# Patient Record
Sex: FEMALE | Employment: STUDENT | URBAN - METROPOLITAN AREA
[De-identification: names, ages, dates, MRNs, and addresses within clinical notes are randomized per-mention and may not be internally consistent; named-entity substitution may affect disease eponyms.]

---
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
## 2021-03-30 ENCOUNTER — OFFICE VISIT (OUTPATIENT)
Dept: FAMILY MEDICINE CLINIC | Facility: CLINIC | Age: 21
End: 2021-03-30
Payer: COMMERCIAL

## 2021-03-30 VITALS
HEIGHT: 67 IN | SYSTOLIC BLOOD PRESSURE: 100 MMHG | DIASTOLIC BLOOD PRESSURE: 68 MMHG | TEMPERATURE: 99 F | HEART RATE: 86 BPM | BODY MASS INDEX: 20.4 KG/M2 | WEIGHT: 130 LBS

## 2021-03-30 DIAGNOSIS — Z76.89 ENCOUNTER TO ESTABLISH CARE: Primary | ICD-10-CM

## 2021-03-30 DIAGNOSIS — Z11.1 SCREENING-PULMONARY TB: ICD-10-CM

## 2021-03-30 PROCEDURE — 3074F SYST BP LT 130 MM HG: CPT | Performed by: FAMILY MEDICINE

## 2021-03-30 PROCEDURE — 86580 TB INTRADERMAL TEST: CPT | Performed by: FAMILY MEDICINE

## 2021-03-30 PROCEDURE — 3078F DIAST BP <80 MM HG: CPT | Performed by: FAMILY MEDICINE

## 2021-03-30 PROCEDURE — 99203 OFFICE O/P NEW LOW 30 MIN: CPT | Performed by: FAMILY MEDICINE

## 2021-03-30 PROCEDURE — 3008F BODY MASS INDEX DOCD: CPT | Performed by: FAMILY MEDICINE

## 2021-03-30 NOTE — PROGRESS NOTES
HPI:   Mitesh Alicia is a 24year old female who presents for an establish care and medical clearance to work. Patient is working at [employer] as a . She is up-to-date with her Tdap and MMR vaccines.   Does not recall her last TB t presents for a complete physical exam.    1. Encounter to establish care  -Medical, surgical and social history, as well as medications and allergies were reviewed with patient.     2. Screening-pulmonary TB  - medical clearance form received and filled out

## 2021-04-02 ENCOUNTER — NURSE ONLY (OUTPATIENT)
Dept: FAMILY MEDICINE CLINIC | Facility: CLINIC | Age: 21
End: 2021-04-02
Payer: COMMERCIAL

## 2021-04-02 DIAGNOSIS — Z11.1 PPD SCREENING TEST: Primary | ICD-10-CM

## 2021-10-19 ENCOUNTER — OFFICE VISIT (OUTPATIENT)
Dept: FAMILY MEDICINE CLINIC | Facility: CLINIC | Age: 21
End: 2021-10-19
Payer: COMMERCIAL

## 2021-10-19 VITALS
HEART RATE: 82 BPM | WEIGHT: 136 LBS | BODY MASS INDEX: 21.35 KG/M2 | DIASTOLIC BLOOD PRESSURE: 80 MMHG | HEIGHT: 67 IN | TEMPERATURE: 99 F | SYSTOLIC BLOOD PRESSURE: 117 MMHG

## 2021-10-19 DIAGNOSIS — Z23 NEED FOR DIPHTHERIA-TETANUS-PERTUSSIS (TDAP) VACCINE: ICD-10-CM

## 2021-10-19 DIAGNOSIS — Z00.00 ANNUAL PHYSICAL EXAM: Primary | ICD-10-CM

## 2021-10-19 DIAGNOSIS — N30.00 ACUTE CYSTITIS WITHOUT HEMATURIA: ICD-10-CM

## 2021-10-19 PROCEDURE — 99395 PREV VISIT EST AGE 18-39: CPT | Performed by: FAMILY MEDICINE

## 2021-10-19 PROCEDURE — 3079F DIAST BP 80-89 MM HG: CPT | Performed by: FAMILY MEDICINE

## 2021-10-19 PROCEDURE — 90471 IMMUNIZATION ADMIN: CPT | Performed by: FAMILY MEDICINE

## 2021-10-19 PROCEDURE — 90715 TDAP VACCINE 7 YRS/> IM: CPT | Performed by: FAMILY MEDICINE

## 2021-10-19 PROCEDURE — 3008F BODY MASS INDEX DOCD: CPT | Performed by: FAMILY MEDICINE

## 2021-10-19 PROCEDURE — 3074F SYST BP LT 130 MM HG: CPT | Performed by: FAMILY MEDICINE

## 2021-10-19 RX ORDER — NORETHINDRONE ACETATE AND ETHINYL ESTRADIOL 1; .02 MG/1; MG/1
TABLET ORAL
COMMUNITY
Start: 2021-10-07

## 2021-10-19 NOTE — PROGRESS NOTES
HPI:   Huang Bowling is a 24year old female who presents for a complete physical exam.    Work: Works as a childcare staff, switching companies  Social: Lives with family  Diet: eats home cooked meals   Exercise:  Active  GYN history:   Last Pap sm BS, no masses or tenderness  MUSCULOSKELETAL: back is not tender, FROM of the back  EXTREMITIES: no cyanosis or edema  NEURO: Oriented times three, cranial nerves are intact, motor and sensory are grossly intact    ASSESSMENT AND PLAN:   Josephine Parker

## 2021-10-20 ENCOUNTER — TELEPHONE (OUTPATIENT)
Dept: FAMILY MEDICINE CLINIC | Facility: CLINIC | Age: 21
End: 2021-10-20

## 2021-10-20 DIAGNOSIS — N30.00 ACUTE CYSTITIS WITHOUT HEMATURIA: Primary | ICD-10-CM

## 2021-10-20 RX ORDER — NITROFURANTOIN 25; 75 MG/1; MG/1
100 CAPSULE ORAL 2 TIMES DAILY
Qty: 10 CAPSULE | Refills: 0 | Status: SHIPPED | OUTPATIENT
Start: 2021-10-20 | End: 2021-10-25

## 2023-07-07 ENCOUNTER — OFFICE VISIT (OUTPATIENT)
Dept: FAMILY MEDICINE CLINIC | Facility: CLINIC | Age: 23
End: 2023-07-07
Payer: COMMERCIAL

## 2023-07-07 VITALS
HEART RATE: 95 BPM | OXYGEN SATURATION: 96 % | TEMPERATURE: 98 F | HEIGHT: 67 IN | RESPIRATION RATE: 18 BRPM | BODY MASS INDEX: 20.88 KG/M2 | DIASTOLIC BLOOD PRESSURE: 80 MMHG | WEIGHT: 133 LBS | SYSTOLIC BLOOD PRESSURE: 121 MMHG

## 2023-07-07 DIAGNOSIS — R30.0 DYSURIA: Primary | ICD-10-CM

## 2023-07-07 LAB
APPEARANCE: CLEAR
BILIRUBIN: NEGATIVE
GLUCOSE (URINE DIPSTICK): NEGATIVE MG/DL
KETONES (URINE DIPSTICK): NEGATIVE MG/DL
MULTISTIX LOT#: ABNORMAL NUMERIC
NITRITE, URINE: POSITIVE
OCCULT BLOOD: NEGATIVE
PH, URINE: 5.5 (ref 4.5–8)
PROTEIN (URINE DIPSTICK): NEGATIVE MG/DL
SPECIFIC GRAVITY: 1.02 (ref 1–1.03)
UROBILINOGEN,SEMI-QN: 0.2 MG/DL (ref 0–1.9)

## 2023-07-07 PROCEDURE — 99203 OFFICE O/P NEW LOW 30 MIN: CPT | Performed by: NURSE PRACTITIONER

## 2023-07-07 PROCEDURE — 3074F SYST BP LT 130 MM HG: CPT | Performed by: NURSE PRACTITIONER

## 2023-07-07 PROCEDURE — 87086 URINE CULTURE/COLONY COUNT: CPT | Performed by: NURSE PRACTITIONER

## 2023-07-07 PROCEDURE — 3008F BODY MASS INDEX DOCD: CPT | Performed by: NURSE PRACTITIONER

## 2023-07-07 PROCEDURE — 81003 URINALYSIS AUTO W/O SCOPE: CPT | Performed by: NURSE PRACTITIONER

## 2023-07-07 PROCEDURE — 3079F DIAST BP 80-89 MM HG: CPT | Performed by: NURSE PRACTITIONER

## 2023-07-07 RX ORDER — NITROFURANTOIN 25; 75 MG/1; MG/1
CAPSULE ORAL
Qty: 14 CAPSULE | Refills: 0 | Status: SHIPPED | OUTPATIENT
Start: 2023-07-07

## 2023-08-24 NOTE — PROGRESS NOTES
Quick Note:    Tests are all normal. Please continue with current treatment plan.  ______ (1) Oriented to own ability

## 2023-09-03 ENCOUNTER — WALK IN (OUTPATIENT)
Dept: URGENT CARE | Age: 23
End: 2023-09-03

## 2023-09-03 VITALS
TEMPERATURE: 98.1 F | WEIGHT: 122 LBS | RESPIRATION RATE: 16 BRPM | SYSTOLIC BLOOD PRESSURE: 118 MMHG | DIASTOLIC BLOOD PRESSURE: 83 MMHG | OXYGEN SATURATION: 100 % | HEART RATE: 80 BPM

## 2023-09-03 DIAGNOSIS — J02.9 SORE THROAT: Primary | ICD-10-CM

## 2023-09-03 DIAGNOSIS — J02.0 STREP PHARYNGITIS: ICD-10-CM

## 2023-09-03 LAB
INTERNAL PROCEDURAL CONTROLS ACCEPTABLE: YES
S PYO AG THROAT QL IA.RAPID: POSITIVE
TEST LOT EXPIRATION DATE: ABNORMAL
TEST LOT NUMBER: ABNORMAL

## 2023-09-03 PROCEDURE — 87880 STREP A ASSAY W/OPTIC: CPT | Performed by: FAMILY MEDICINE

## 2023-09-03 PROCEDURE — 99203 OFFICE O/P NEW LOW 30 MIN: CPT

## 2023-09-03 RX ORDER — PENICILLIN V POTASSIUM 500 MG/1
500 TABLET ORAL 2 TIMES DAILY
Qty: 20 TABLET | Refills: 0 | Status: SHIPPED | OUTPATIENT
Start: 2023-09-03 | End: 2023-09-13

## 2023-09-03 ASSESSMENT — PAIN SCALES - GENERAL: PAINLEVEL: 7

## 2023-09-19 ENCOUNTER — OFFICE VISIT (OUTPATIENT)
Dept: FAMILY MEDICINE CLINIC | Facility: CLINIC | Age: 23
End: 2023-09-19
Payer: COMMERCIAL

## 2023-09-19 VITALS
WEIGHT: 122 LBS | HEIGHT: 67 IN | OXYGEN SATURATION: 100 % | RESPIRATION RATE: 20 BRPM | HEART RATE: 77 BPM | TEMPERATURE: 97 F | BODY MASS INDEX: 19.15 KG/M2 | DIASTOLIC BLOOD PRESSURE: 74 MMHG | SYSTOLIC BLOOD PRESSURE: 108 MMHG

## 2023-09-19 DIAGNOSIS — Z87.09 HISTORY OF STREP PHARYNGITIS: ICD-10-CM

## 2023-09-19 DIAGNOSIS — J02.9 SORE THROAT: Primary | ICD-10-CM

## 2023-09-19 PROCEDURE — 3074F SYST BP LT 130 MM HG: CPT | Performed by: NURSE PRACTITIONER

## 2023-09-19 PROCEDURE — 3008F BODY MASS INDEX DOCD: CPT | Performed by: NURSE PRACTITIONER

## 2023-09-19 PROCEDURE — 99213 OFFICE O/P EST LOW 20 MIN: CPT | Performed by: NURSE PRACTITIONER

## 2023-09-19 PROCEDURE — 87081 CULTURE SCREEN ONLY: CPT | Performed by: NURSE PRACTITIONER

## 2023-09-19 PROCEDURE — 3078F DIAST BP <80 MM HG: CPT | Performed by: NURSE PRACTITIONER

## 2023-09-21 ENCOUNTER — TELEPHONE (OUTPATIENT)
Dept: TELEHEALTH | Age: 23
End: 2023-09-21

## 2023-09-21 DIAGNOSIS — J02.0 STREP PHARYNGITIS: Primary | ICD-10-CM

## 2023-09-21 RX ORDER — CEPHALEXIN 500 MG/1
500 CAPSULE ORAL 2 TIMES DAILY
Qty: 20 CAPSULE | Refills: 0 | Status: SHIPPED | OUTPATIENT
Start: 2023-09-21 | End: 2023-10-01

## 2023-09-21 NOTE — TELEPHONE ENCOUNTER
Throat culture positive for GAS. Cephalexin BID x 10 days sent to pharmacy. She was treated with PenVK previously.

## 2024-02-14 ENCOUNTER — HOSPITAL ENCOUNTER (EMERGENCY)
Facility: HOSPITAL | Age: 24
Discharge: HOME | End: 2024-02-14
Attending: EMERGENCY MEDICINE
Payer: COMMERCIAL

## 2024-02-14 VITALS
RESPIRATION RATE: 18 BRPM | OXYGEN SATURATION: 94 % | HEART RATE: 54 BPM | TEMPERATURE: 99 F | SYSTOLIC BLOOD PRESSURE: 113 MMHG | DIASTOLIC BLOOD PRESSURE: 82 MMHG

## 2024-02-14 DIAGNOSIS — R04.0 EPISTAXIS: Primary | ICD-10-CM

## 2024-02-14 PROCEDURE — 30901 CONTROL OF NOSEBLEED: CPT

## 2024-02-14 PROCEDURE — 99283 EMERGENCY DEPT VISIT LOW MDM: CPT

## 2024-02-14 NOTE — ED QUICK NOTES
Patient safe to discharge home per ED Provider. Discharge education provided, including follow up instructions. Patient verbalizes understanding.   Nasal clip provided.

## 2024-02-14 NOTE — ED PROVIDER NOTES
Patient Seen in: [facility] Emergency Department    History     Chief Complaint   Patient presents with    Nose Bleed     Stated Complaint: nose bleed    HPI    Patient complains of a nosebleed that began today.  Bleeding coming from l side.  No trauma.     Medication risks: none  Associated symptoms: none    Past Medical History:   Diagnosis Date    Anemia        History reviewed. No pertinent surgical history.         No family history on file.    Social History     Socioeconomic History    Marital status: Single   Tobacco Use    Smoking status: Never    Smokeless tobacco: Never   Vaping Use    Vaping Use: Never used   Substance and Sexual Activity    Alcohol use: Yes     Alcohol/week: 0.0 standard drinks of alcohol    Drug use: No       Review of Systems    Positive for stated complaint: nose bleed  Other systems are as noted in HPI.  Constitutional and vital signs reviewed.      All other systems reviewed and negative except as noted above.    PSFH elements reviewed from today and agreed except as otherwise stated in HPI.    Physical Exam     ED Triage Vitals [02/14/24 1035]   /86   Pulse 68   Resp 18   Temp 98.8 °F (37.1 °C)   Temp src Tympanic   SpO2 98 %   O2 Device None (Room air)       Current:/82   Pulse 52   Temp 98.8 °F (37.1 °C) (Tympanic)   Resp 18   LMP 02/14/2024 (Exact Date)   SpO2 99%   PULSE OX nl  GENERAL: awake alert  HEAD: normocephalic, atraumatic  EYES: PERRLA, EOMI,  NOSE: dried clots l side,no clots in post pharynx  THROAT: mmm, no lesions  NECK: supple, no meningeal signs  LUNGS: no resp distress, cta bilateral  CARDIO: RRR without murmur  GI: soft, non-tender, normal bowel sounds  EXTREMITIES: moves all 4 spont, no edema  NEURO: alert, oriented x 3, 2-12 intact, no focal deficits appreciated  SKIN: good skin turgor, no rashes  PSYCH: calm, cooperative,          Physical Exam          ED Course   Labs Reviewed - No data to display    MDM     Monitor  Interpretation:        Procedures:  The patient was anesthetized with phenylepherine and topical lido.    The patient was treated with pressure, vasoconstrictor medication then vaseline gauze.  Following the procedure the patient was re-examined and the bleeding was well controlled.  The patient tolerated the procedure well. The procedure was performed by myself.    Medical Decision Making  Problems Addressed:  Epistaxis: acute illness or injury    Risk  OTC drugs.            Disposition and Plan     Clinical Impression:  1. Epistaxis        Disposition:  Discharge    Follow-up:  Matthew Diaz MD  [address] [place] [address] [phone]    Follow up        Medications Prescribed:  Current Discharge Medication List

## 2024-02-14 NOTE — ED INITIAL ASSESSMENT (HPI)
Pt ambulatory through triage w/ c/o nose bleed that started 2 hours PTA. Denies any injury or trauma to the area. Pt states that she had to have her nose cauterized in the past. Pt states that she has had intermittent dizziness this morning. Bleeding controlled currently. Per pt - hx of anemia.

## 2024-02-19 ENCOUNTER — OFFICE VISIT (OUTPATIENT)
Dept: OTOLARYNGOLOGY | Facility: CLINIC | Age: 24
End: 2024-02-19

## 2024-02-19 DIAGNOSIS — R04.0 EPISTAXIS: Primary | ICD-10-CM

## 2024-02-19 NOTE — PROGRESS NOTES
Ruma Matson is a 24 year old female.   Chief Complaint   Patient presents with    Nose Problem     Patient is here due to nosebleed follow up from hospital.        ASSESSMENT AND PLAN:   1. Epistaxis  24-year-old presents with epistaxis from the left side.  She had a severe left-sided nosebleed was seen in the emergency room on February 14.  She had a history of nasal cautery in the past    On exam she has a dry and somewhat deviated nasal septum on the left.  A couple prominent nasal capillaries.  Decision made to perform cautery today.    Cautery performed today.  Discussed ointment application at least 1-2 times a day with a Q-tip.  Discussed how to stop a nosebleed and nosebleed precautions.  She can see me back as needed    Consult from Dr. Young regarding epistaxis    The patient indicates understanding of these issues and agrees to the plan.      EXAM:   [facility] 02/14/2024 (Exact Date)     Pertinent exam findings may also be noted above in assessment and plan     System Details   Skin Inspection - Normal.   Constitutional Overall appearance - Normal.   Head/Face Symmetric, TMJ tenderness not present    Eyes EOMI, PERRL   Right ear:  Canal clear, TM intact, no ADÁN   Left ear:  Canal clear, TM intact, no ADÁN   Nose: Septum midline, inferior turbinates not enlarged, nasal valves without collapse    Oral cavity/Oropharynx: No lesions or masses on inspection or palpation, tonsils symmetric    Neck: Soft without LAD, thyroid not enlarged  Voice clear/ no stridor   Other:      Scopes and Procedures:   Procedures:  Control Epistaxis:  Pre-Procedure Care: Consent was obtained. Procedure/Risks were explained. Questions were answered. Correct patient identified. Correct side and site confirmed.   Control of a simple anterior nosebleed was performed. Location of the bleed was Kiesselbach's plexus. The procedure was performed on the left side.  Bleeding site/vessels were treated with AgNO3 cauterization.  Anesthesia used  was topical Lidocaine/epinephrine 4%/1:60718   Patient tolerated the procedure well. Discharge instructions were discussed with the patient/parent. Epistaxis precautions were explained and understood. Use Vaseline and/or nasal saline gel to the affected area TID.           Current Outpatient Medications   Medication Sig Dispense Refill    Norethindrone Acet-Ethinyl Est 1-20 MG-MCG Oral Tab TAKE 1 TABLET BY MOUTH DAILY CONTINUOUSLY TO SKIP PERIODS        Past Medical History:   Diagnosis Date    Anemia       Social History:  Social History     Socioeconomic History    Marital status: Single   Tobacco Use    Smoking status: Never    Smokeless tobacco: Never   Vaping Use    Vaping Use: Never used   Substance and Sexual Activity    Alcohol use: Yes     Alcohol/week: 0.0 standard drinks of alcohol    Drug use: No          Jonathan Brown MD  2/19/2024  5:43 PM

## 2024-08-06 ENCOUNTER — LAB ENCOUNTER (OUTPATIENT)
Dept: LAB | Age: 24
End: 2024-08-06
Attending: STUDENT IN AN ORGANIZED HEALTH CARE EDUCATION/TRAINING PROGRAM
Payer: COMMERCIAL

## 2024-08-06 ENCOUNTER — OFFICE VISIT (OUTPATIENT)
Dept: OBGYN CLINIC | Facility: CLINIC | Age: 24
End: 2024-08-06
Payer: COMMERCIAL

## 2024-08-06 VITALS
BODY MASS INDEX: 19.78 KG/M2 | DIASTOLIC BLOOD PRESSURE: 88 MMHG | SYSTOLIC BLOOD PRESSURE: 148 MMHG | WEIGHT: 126 LBS | HEART RATE: 66 BPM | HEIGHT: 67 IN

## 2024-08-06 DIAGNOSIS — Z01.419 ENCOUNTER FOR WELL WOMAN EXAM WITH ROUTINE GYNECOLOGICAL EXAM: Primary | ICD-10-CM

## 2024-08-06 DIAGNOSIS — N92.6 MISSED MENSES: ICD-10-CM

## 2024-08-06 DIAGNOSIS — Z11.3 SCREENING EXAMINATION FOR STD (SEXUALLY TRANSMITTED DISEASE): ICD-10-CM

## 2024-08-06 LAB
B-HCG SERPL-ACNC: <2.6 MIU/ML
HBV SURFACE AG SER-ACNC: 0.19 [IU]/L
HBV SURFACE AG SERPL QL IA: NONREACTIVE
HCV AB SERPL QL IA: NONREACTIVE
T PALLIDUM AB SER QL IA: NONREACTIVE

## 2024-08-06 PROCEDURE — 84702 CHORIONIC GONADOTROPIN TEST: CPT

## 2024-08-06 PROCEDURE — 87389 HIV-1 AG W/HIV-1&-2 AB AG IA: CPT

## 2024-08-06 PROCEDURE — 3077F SYST BP >= 140 MM HG: CPT | Performed by: STUDENT IN AN ORGANIZED HEALTH CARE EDUCATION/TRAINING PROGRAM

## 2024-08-06 PROCEDURE — 87340 HEPATITIS B SURFACE AG IA: CPT

## 2024-08-06 PROCEDURE — 3008F BODY MASS INDEX DOCD: CPT | Performed by: STUDENT IN AN ORGANIZED HEALTH CARE EDUCATION/TRAINING PROGRAM

## 2024-08-06 PROCEDURE — 99395 PREV VISIT EST AGE 18-39: CPT | Performed by: STUDENT IN AN ORGANIZED HEALTH CARE EDUCATION/TRAINING PROGRAM

## 2024-08-06 PROCEDURE — 3079F DIAST BP 80-89 MM HG: CPT | Performed by: STUDENT IN AN ORGANIZED HEALTH CARE EDUCATION/TRAINING PROGRAM

## 2024-08-06 PROCEDURE — 86780 TREPONEMA PALLIDUM: CPT

## 2024-08-06 PROCEDURE — 36415 COLL VENOUS BLD VENIPUNCTURE: CPT

## 2024-08-06 PROCEDURE — 86803 HEPATITIS C AB TEST: CPT

## 2024-08-06 PROCEDURE — 99203 OFFICE O/P NEW LOW 30 MIN: CPT | Performed by: STUDENT IN AN ORGANIZED HEALTH CARE EDUCATION/TRAINING PROGRAM

## 2024-08-06 NOTE — PROGRESS NOTES
Ruma Matson is a 24 year old female  Patient's last menstrual period was 05/15/2024 (exact date).   Chief Complaint   Patient presents with    Annual     NP, Annual Exam with pap , pt requesting pregnancy test ([facility])     LMP 5/15/24; regular cycles, [id number]; last about 4-6 days  No dysmenorrhea  Had taken ocps in the past and self dc'd; had too many mood changes.  Has hx of irregular periods and after ocp use they were normal.    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    Took a upt at home; negative last week of  and ; went to [facility] July 15 and neg    Not currently sexually active  Had unprotected intercourse; around   Denies hx of STI   Never had pap smear    Had gardasil vaccines in the past    OBSTETRICS HISTORY:     OB History    Para Term  AB Living   0 0 0 0 0 0   SAB IAB Ectopic Multiple Live Births   0 0 0 0 0       GYNE HISTORY:     Hx Prior Abnormal Pap: No  Pap Result Notes: never had pap smear   Menarche: 12 (2024  1:19 PM)  Period Cycle (Days): irregular since 2024 (2024  1:19 PM)  Period Duration (Days): 4-6 (2024  1:19 PM)  Period Flow: moderate (2024  1:19 PM)  Use of Birth Control (if yes, specify type): Rhythm (2024  1:19 PM)  Hx Prior Abnormal Pap: No (2024  1:19 PM)  Pap Result Notes: never had pap smear (2024  1:19 PM)         No data to display                  MEDICAL HISTORY:     Past Medical History:    Anemia       SURGICAL HISTORY:     History reviewed. No pertinent surgical history.    SOCIAL HISTORY:     Social History     Socioeconomic History    Marital status: Single   Tobacco Use    Smoking status: Never    Smokeless tobacco: Never   Vaping Use    Vaping status: Never Used   Substance and Sexual Activity     Alcohol use: Not Currently    Drug use: No        FAMILY HISTORY:     Family History   Problem Relation Age of Onset    Cancer Paternal Grandmother         breast cancer       MEDICATIONS:       Current Outpatient Medications:     Norethindrone Acet-Ethinyl Est 1-20 MG-MCG Oral Tab, TAKE 1 TABLET BY MOUTH DAILY CONTINUOUSLY TO SKIP PERIODS, Disp: , Rfl:     ALLERGIES:     No Known Allergies      REVIEW OF SYSTEMS:     Constitutional:    denies fever / chills  Eyes:     denies blurred or double vision  Cardiovascular:  denies chest pain or palpitations  Respiratory:    denies shortness of breath  Gastrointestinal:  denies severe abdominal pain, frequent diarrhea or constipation, nausea / vomiting  Genitourinary:    denies dysuria, bothersome incontinence  Skin/Breast:   denies any breast pain, lumps, or discharge  Neurological:    denies frequent severe headaches  Psychiatric:   denies depression or anxiety, thoughts of harming self or others  Heme/Lymph:    denies easy bruising or bleeding      PHYSICAL EXAM:   Blood pressure 148/88, pulse 66, height 5' 7\" (1.702 m), weight 126 lb (57.2 kg), last menstrual period 05/15/2024, not currently breastfeeding.  Constitutional:  well developed, well nourished  Head/Face:  normocephalic  Neck/Thyroid: thyroid symmetric, no thyromegaly, no nodules, no adenopathy  Lymphatic: no abnormal supraclavicular or axillary adenopathy is noted  Respiratory:      chest wall symmetric and nontender on palpation, clear to asculation bilateral, no wheezing, rales, ronchi, and resonance normal upon percussion  Cardiovascular: chest normal in appearance, regular rate and rhythm, no murmurs, PMI palpated midclavicular line  Breast:   normal without palpable masses, tenderness, asymmetry, nipple discharge, nipple retraction or skin changes  Abdomen:   soft, nontender, nondistended, no masses  Skin/Hair:  no unusual rashes or bruises  Extremities:  no edema, no cyanosis, non tender  bilaterally  Psychiatric:   oriented to time, place, person and situation. Appropriate mood and affect    Pelvic Exam:  GYNE/: External Genitalia: Normal appearing, no lesions. Urethral meatus appear wnl, no abnormal discharge or lesions noted.          Bladder: well supported, urethra wnl, no lesions or fissures                     Vagina: normal pink mucosa, no lesions, normal clear discharge.                      Uterus: anteverted, mobile, non tender, normal size                     Cervix: Normal,                     Adnexa: non tender, no masses, normal size  Anus: no hemorroids         ASSESSMENT & PLAN:     Ruma was seen today for annual.    Diagnoses and all orders for this visit:    Encounter for well woman exam with routine gynecological exam  -     ThinPrep PAP Smear [E]; Future  -     Chlamydia/GC PCR Combo; Future    Screening examination for STD (sexually transmitted disease)  -     Chlamydia/GC PCR Combo; Future  -     HIV Ag/Ab Combo; Future  -     HCV Antibody; Future  -     T Pallidum Screening Cascade; Future  -     Hepatitis B Surface Antigen; Future    Missed menses  -     HCG, Beta Subunit (Quant Pregnancy Test); Future      Normal exam.  Pap smear and GC/Chlamydia cervical cultures done.   Recommend repeat pap smear with co-testing every 3 years for normal/ -HPV.  Contraceptive counseling completed.  Screening mammogram recommended starting at 40 unless significant family history or concerning symptoms.  Maintain healthy lifestyle with well-balance diet and daily exercise.   Return to clinic in one year or as needed.    #missed menses: pt decline upt; desires hcg quant. Will send  Reviewed safe sex practices and contraceptive options prn      FOLLOW-UP     No follow-ups on file.      Sharmin Matson MD  8/6/2024

## 2024-08-07 LAB
C TRACH DNA SPEC QL NAA+PROBE: NEGATIVE
N GONORRHOEA DNA SPEC QL NAA+PROBE: NEGATIVE

## (undated) NOTE — MR AVS SNAPSHOT
[address]  [address] [facility]  [phone]               Thank you for choosing us for your health care visit with Marisela Snell MD.  We are glad to serve you and happy to provide you with this summa It is the patient's responsibility to check with and follow their insurance company's guidelines for prior authorization for this test.  You may be held responsible for payment in full if proper authorization is not acquired.   Please contact the Patient [address] for [address] [place] [address]  [address] [place] [address] [address]  Main [address], [address], [address]    [facility]  [address], [facility] [facility]        Treatment: Evaluate & Reba For medical emergencies, dial 911.             Educational Information     Healthy Active Living  An initiative of the American Academy of Pediatrics    Fact Sheet: Healthy Active Living for Families    Healthy nutrition starts as early as infancy with chrissy Healthy active children are more likely to be healthy active adults!              Visit [facility] online at  [email or web address]

## (undated) NOTE — Clinical Note
5/1/2017              Mile Del        [address] [facility]        [place] [address]         Dear Nissa Douglas,      It was a pleasure to see you at our [place] , [address], [address] [facility] office.   Your tests are all normal. Please continue wit

## (undated) NOTE — Clinical Note
VACCINE ADMINISTRATION RECORD  PARENT / GUARDIAN APPROVAL  Date: 2017  Vaccine administered to: Kandice Chavez     : 2000    MRN: [id number]    A copy of the appropriate Centers for Disease Control and Prevention Vaccine Information stateme

## (undated) NOTE — LETTER
Name:  Jovon Wilkinson Year:  12th Grade Class: Student ID No.:   Address:  [address] [facility]  [address] [place] [address] Phone:  [phone] (home)  : 327/ 16year old   Name Relationship [address] Work Phone Home Phone Mobile Phone   1 13. Does anyone in your family have a heart problem, pacemaker, or implanted defibrillator? No   16. Has anyone in your family had unexplained fainting, seizures, or near drowning?  No   BONE AND JOINT QUESTIONS    17. Have you ever had an injury to a bone, 38. Have you ever had numbness, tingling, or weakness in your arms or legs after being hit or falling? No   39. Have you ever been unable to move your arms / legs after being hit /fall? No   40. Have you ever become ill while exercising in the heat?  No   41 Appearance:  Marfan stigmata (kyphoscoliosis, high-arched palate, pectus excavatum,      arachnodactyly, arm span > height, hyperlaxity, myopia, MVP, aortic insufficiency) Yes    Eyes/Ears/Nose/Throat:    · Pupils equal  · Hearing Yes    Lymph nodes Yes that I/our student will not use performance-enhancing substances as defined in the [facility] Performance-Enhancing Substance Testing Program Protocol.  We have reviewed the policy and understand that I/our student may be asked to submit to testing for the presen

## (undated) NOTE — MR AVS SNAPSHOT
[address]  [address] [facility] [address]  [phone]  [phone]               Thank you for choosing us for your health care visit with Richard Kc PT.   We are glad to serve you and happy to provide you with this summary [facility] in [address] [place] [address] ([address])    [address] [facility] [address]   [phone]           Please arrive at your scheduled appointment time. Wear comfortable, loose fitting clothing.             May 25, 2017  4:45 PM

## (undated) NOTE — LETTER
11/18/21        Kandice Chavez  [address]  [address] [place] [address]      Dear Elijah Riddle,    [address] [facility] records indicate that you have outstanding lab work and or testing that was ordered for you and has not yet been completed:  Orders Placed This

## (undated) NOTE — LETTER
VACCINE ADMINISTRATION RECORD  PARENT / GUARDIAN APPROVAL  Date: 10/26/2017  Vaccine administered to: Risa Javier     : 2000    MRN: [id number]    A copy of the appropriate Centers for Disease Control and Prevention Vaccine Information statem